# Patient Record
Sex: MALE | Race: ASIAN | NOT HISPANIC OR LATINO | Employment: OTHER | URBAN - METROPOLITAN AREA
[De-identification: names, ages, dates, MRNs, and addresses within clinical notes are randomized per-mention and may not be internally consistent; named-entity substitution may affect disease eponyms.]

---

## 2017-06-05 ENCOUNTER — TELEPHONE (OUTPATIENT)
Dept: HEPATOLOGY | Facility: CLINIC | Age: 67
End: 2017-06-05

## 2017-06-05 DIAGNOSIS — K74.60 CIRRHOSIS OF LIVER WITHOUT ASCITES, UNSPECIFIED HEPATIC CIRRHOSIS TYPE: Primary | ICD-10-CM

## 2017-06-05 NOTE — TELEPHONE ENCOUNTER
MA spoke with pt's son. Follow up appt schedule. Patient address verified, appt letter mailed to patient.

## 2017-06-05 NOTE — TELEPHONE ENCOUNTER
----- Message from Lacey Tran sent at 6/1/2017 12:46 PM CDT -----  Contact: patient  Patient son calling to schedule his appt with shama. Please call  [phone]

## 2017-06-16 ENCOUNTER — TELEPHONE (OUTPATIENT)
Dept: HEPATOLOGY | Facility: CLINIC | Age: 67
End: 2017-06-16

## 2017-06-16 NOTE — TELEPHONE ENCOUNTER
MA spoke with pt;s son, appt reschedule for Monday 06/19 @ 9am on the [employer] Pt's son verbalized understanding.

## 2017-06-16 NOTE — TELEPHONE ENCOUNTER
----- Message from Lacey Tran sent at 6/15/2017  4:29 PM CDT -----  Contact: patient son   Calling to see if someone can call him about his dads labs appt that were cancelled. Please call

## 2017-06-29 ENCOUNTER — TELEPHONE (OUTPATIENT)
Dept: HEPATOLOGY | Facility: CLINIC | Age: 67
End: 2017-06-29

## 2017-06-29 ENCOUNTER — LAB VISIT (OUTPATIENT)
Dept: LAB | Facility: HOSPITAL | Age: 67
End: 2017-06-29
Payer: MEDICARE

## 2017-06-29 ENCOUNTER — OFFICE VISIT (OUTPATIENT)
Dept: HEPATOLOGY | Facility: CLINIC | Age: 67
End: 2017-06-29
Payer: MEDICARE

## 2017-06-29 VITALS
HEIGHT: 65 IN | WEIGHT: 145.94 LBS | SYSTOLIC BLOOD PRESSURE: 123 MMHG | BODY MASS INDEX: 24.32 KG/M2 | RESPIRATION RATE: 18 BRPM | DIASTOLIC BLOOD PRESSURE: 79 MMHG | HEART RATE: 72 BPM | OXYGEN SATURATION: 97 % | TEMPERATURE: 98 F

## 2017-06-29 DIAGNOSIS — I85.10 SECONDARY ESOPHAGEAL VARICES WITHOUT BLEEDING: ICD-10-CM

## 2017-06-29 DIAGNOSIS — B18.1 CHRONIC HEPATITIS B WITH CIRRHOSIS: ICD-10-CM

## 2017-06-29 DIAGNOSIS — K74.60 CIRRHOSIS OF LIVER WITHOUT ASCITES, UNSPECIFIED HEPATIC CIRRHOSIS TYPE: Primary | ICD-10-CM

## 2017-06-29 DIAGNOSIS — B18.1 CHRONIC VIRAL HEPATITIS B WITHOUT DELTA-AGENT: ICD-10-CM

## 2017-06-29 DIAGNOSIS — K74.60 CHRONIC HEPATITIS B WITH CIRRHOSIS: ICD-10-CM

## 2017-06-29 LAB
AFP SERPL-MCNC: 2.5 NG/ML
ALBUMIN SERPL BCP-MCNC: 3.7 G/DL
ALP SERPL-CCNC: 151 U/L
ALT SERPL W/O P-5'-P-CCNC: 57 U/L
ANION GAP SERPL CALC-SCNC: 9 MMOL/L
AST SERPL-CCNC: 51 U/L
BASOPHILS # BLD AUTO: 0.02 K/UL
BASOPHILS NFR BLD: 0.6 %
BILIRUB SERPL-MCNC: 0.6 MG/DL
BUN SERPL-MCNC: 22 MG/DL
CALCIUM SERPL-MCNC: 9.4 MG/DL
CHLORIDE SERPL-SCNC: 109 MMOL/L
CO2 SERPL-SCNC: 25 MMOL/L
CREAT SERPL-MCNC: 1.1 MG/DL
DIFFERENTIAL METHOD: ABNORMAL
EOSINOPHIL # BLD AUTO: 0.1 K/UL
EOSINOPHIL NFR BLD: 4.5 %
ERYTHROCYTE [DISTWIDTH] IN BLOOD BY AUTOMATED COUNT: 13.7 %
EST. GFR  (AFRICAN AMERICAN): >60 ML/MIN/1.73 M^2
EST. GFR  (NON AFRICAN AMERICAN): >60 ML/MIN/1.73 M^2
GLUCOSE SERPL-MCNC: 159 MG/DL
HBV SURFACE AB SER-ACNC: NEGATIVE M[IU]/ML
HBV SURFACE AG SERPL QL IA: POSITIVE
HCT VFR BLD AUTO: 44.9 %
HGB BLD-MCNC: 15.4 G/DL
INR PPP: 1
LYMPHOCYTES # BLD AUTO: 1.1 K/UL
LYMPHOCYTES NFR BLD: 34.6 %
MCH RBC QN AUTO: 32.2 PG
MCHC RBC AUTO-ENTMCNC: 34.3 %
MCV RBC AUTO: 94 FL
MONOCYTES # BLD AUTO: 0.3 K/UL
MONOCYTES NFR BLD: 9.1 %
NEUTROPHILS # BLD AUTO: 1.6 K/UL
NEUTROPHILS NFR BLD: 51.2 %
PLATELET # BLD AUTO: 74 K/UL
PMV BLD AUTO: 12.8 FL
POTASSIUM SERPL-SCNC: 4 MMOL/L
PROT SERPL-MCNC: 7.6 G/DL
PROTHROMBIN TIME: 10.5 SEC
RBC # BLD AUTO: 4.78 M/UL
SODIUM SERPL-SCNC: 143 MMOL/L
WBC # BLD AUTO: 3.09 K/UL

## 2017-06-29 PROCEDURE — 80053 COMPREHEN METABOLIC PANEL: CPT

## 2017-06-29 PROCEDURE — 36415 COLL VENOUS BLD VENIPUNCTURE: CPT

## 2017-06-29 PROCEDURE — 1126F AMNT PAIN NOTED NONE PRSNT: CPT | Mod: ,,, | Performed by: NURSE PRACTITIONER

## 2017-06-29 PROCEDURE — 82105 ALPHA-FETOPROTEIN SERUM: CPT

## 2017-06-29 PROCEDURE — 1159F MED LIST DOCD IN RCRD: CPT | Mod: ,,, | Performed by: NURSE PRACTITIONER

## 2017-06-29 PROCEDURE — 86707 HEPATITIS BE ANTIBODY: CPT

## 2017-06-29 PROCEDURE — 87517 HEPATITIS B DNA QUANT: CPT

## 2017-06-29 PROCEDURE — 99999 PR PBB SHADOW E&M-EST. PATIENT-LVL IV: CPT | Mod: PBBFAC,,, | Performed by: NURSE PRACTITIONER

## 2017-06-29 PROCEDURE — 86706 HEP B SURFACE ANTIBODY: CPT

## 2017-06-29 PROCEDURE — 99214 OFFICE O/P EST MOD 30 MIN: CPT | Mod: S$PBB,,, | Performed by: NURSE PRACTITIONER

## 2017-06-29 PROCEDURE — 87340 HEPATITIS B SURFACE AG IA: CPT

## 2017-06-29 PROCEDURE — 85610 PROTHROMBIN TIME: CPT

## 2017-06-29 PROCEDURE — 87350 HEPATITIS BE AG IA: CPT

## 2017-06-29 PROCEDURE — 85025 COMPLETE CBC W/AUTO DIFF WBC: CPT

## 2017-06-29 RX ORDER — PROPRANOLOL HYDROCHLORIDE 20 MG/1
20 TABLET ORAL 2 TIMES DAILY
COMMUNITY
Start: 2017-06-08 | End: 2021-11-30

## 2017-06-29 RX ORDER — TENOFOVIR DISOPROXIL FUMARATE 300 MG/1
300 TABLET, COATED ORAL DAILY
COMMUNITY
Start: 2017-06-08 | End: 2021-11-30

## 2017-06-29 NOTE — PROGRESS NOTES
[facility] Established Patient Visit    Reason for Visit:  Hepatitis B and cirrhosis    PCP: Logan Centeno    HPI:  This is a 67 y.o. male pt of Dr. Aguilar's here for f/u of well-compensated cirrhosis due to chronic hepatitis B. He has E Ab (+), E Ag (-) chronic hepatitis B and has been maintained on entecavir 1 mg daily. He was initially seen and treated with tenofovir by Dr Wisdom at a time when he presented with a significant variceal bleed. In 2008, he was found to have a measurable HBV DNA viral load and was switched to entecavir. His HBV DNA has remained negative. He is overdue for HCC screening and routine labs. He was last seen one year ago and did not have HCC screening since 5/2016.     He speaks very little English. Visit was conducted with  over the phone today. On his med card he has both entecavir and tenofovir listed. Pt reports he is taking both meds. He tells me he [place] [facility] [place]. He reports is taking propranolol also. He has not had repeat EGD still.    He is immune to hep A. Previous labs showed undetectable viral load with minimally elevated transaminases in 30-40's and normal liver functioning. Plts low at 60-70's. U/s in 3/2015 showed nodular liver with a 0.9 x 0.9 cm small echogenic focus seen within the left lobe of the liver and a 0.9 x 0.8-cm echogenic focus within the right lobe of the liver, unchanged from the prior study could represent small better delineated nodules. MRI was done and showed no enhancing liver lesions. U/s 5/2016 showed subcentimeter echogenic focus within the left lobe of the liver too small to characterize. AFP has been normal.    Pt denies any jaundice, dark urine, hematemesis, melena, slowed mentation, abd distention. He continues to drink 2 beers per week. Pt has been advised pt not to drink at all with his cirrhosis.    He lives with a son, son not been tested or vaccinated. This was discussed at last  "visit but pt's son still has not been tested.      ROS:   GENERAL: Denies fever, chills, weight change, fatigue  HEENT: Denies headaches, dizziness, vision/hearing changes  CARDIOVASCULAR: Denies chest pain, palpitations, or edema  RESPIRATORY: Denies dyspnea, cough  GI: Denies abdominal pain, rectal bleeding, nausea, vomiting. No change in bowel pattern or color  : Denies dysuria, hematuria   SKIN: Denies rash, itching   NEURO: Denies confusion, memory loss, or mood changes  PSYCH: Denies depression or anxiety  HEME/LYMPH: Denies easy bruising or bleeding      PMHX:  has a past medical history of Cirrhosis and Hepatitis B.    PSHX:  has a past surgical history that includes Upper gastrointestinal endoscopy; Colonoscopy; and Neck surgery.    The patient's social and family histories were reviewed by me and updated in the appropriate section of the electronic medical record.    No Known Allergies    Medications reviewed in Epic      Objective Findings:    PHYSICAL EXAM:   Friendly  male, in no acute distress; alert and oriented to person, place and time  VITALS:   /79 (BP Location: Left arm, Patient Position: Sitting)   Pulse 72   Temp 97.7 °F (36.5 °C) (Oral)   Resp 18   Ht 5' 5" (1.651 m)   Wt 66.2 kg (145 lb 15.1 oz)   SpO2 97%   BMI 24.29 kg/m²    HENT: Normocephalic, without obvious abnormality. Oral mucosa pink and moist. Dentition fair.  EYES: Sclerae anicteric.    NECK: Supple.   CARDIOVASCULAR: Regular rate and rhythm. No murmurs.  RESPIRATORY: Normal respiratory effort. BBS CTA. No wheezes or crackles.  GI: Soft, non-tender, non-distended. No hepatosplenomegaly. No masses palpable. No ascites.  EXTREMITIES:  No clubbing, cyanosis or edema.  SKIN: Warm and dry. No jaundice. No rashes noted to exposed skin. No telangectasias noted. No palmar erythema.  NEURO:  Normal gate. No asterixis.  PSYCH:  Memory intact. Thought and speech pattern appropriate. Behavior normal. No depression or " anxiety noted.      Labs:  Lab Results   Component Value Date    WBC 3.79 (L) 05/02/2016    HGB 15.7 05/02/2016    HCT 46.7 05/02/2016    PLT 71 (L) 05/02/2016     05/02/2016    K 4.7 05/02/2016    CREATININE 1.4 05/02/2016    ALT 35 05/02/2016    AST 46 (H) 05/02/2016    ALKPHOS 110 05/02/2016    BILITOT 0.6 05/02/2016    ALBUMIN 3.8 05/02/2016    INR 1.0 05/02/2016    AFP 2.7 05/02/2016       ASSESSMENT:  67 y.o.  male with:  1.  Chronic hepatitis B, E Ag (-), E Ab (+)  -- transaminases normal  -- HBV DNA undetectable, labs overdue though  -- synthetic liver function- nl  -- HCC screening: needs updating  -- immunity to HAV- labs indicate immunity  -- unknown if family members have been vaccinated. Pt lives with son  2. Cirrhosis, well-compensated currently  -- MELD= 10 (5/2016)  -- HCC screening- AFP and abd. U/S- needs repeat  -- Immunity to Hep A - labs indicate immunity  -- EGD- ordered again  3. Portal hypertension  -- EGD- h/o bleeding esophageal varices, on propranolol  -- Ascites- none   -- thrombocytopenia and partial portal vein thrombosis   4. Alcohol use  -- pt advised no alcohol with cirrhosis      EDUCATION:   -- Avoid alcohol. Avoid raw seafood.   -- Discussed transmission of HBV, including sexual transmission. Avoid sharing razors/toothbrushes, etc.   -- Natural history of HBV including progression to cirrhosis reviewed.   -- We discussed the increased risk of hepatocellular carcinoma due to active hepatitis B infection. Continued screening every six months with ultrasound and AFP is recommended.   -- Discussed potential outcomes of cirrhosis, including liver cancer, liver failure, liver transplant, death.   -- Limit acetaminophen to 2000mg daily.  -- Advised immunization of all household members.  -- Discussed importance of compliance with medication regimen and risk of viral mutation if treatment is stopped prematurely.      Handouts in [employer] given to pt. Above was discussed with  pt via .    Called and spoke with pt's pharmacist. She reports pt was seen by Dr. Hinds on 6/8/17 and Viread was Rx'ed. He then had visit again on 6/22. Last refill for entecavir was in 3/2017. Call placed to Dr. Hinds's office to discuss pt's plan of care.       PLAN:  1. Labs today  2. Abd u/s  3. EGD ordered. Discussed importance of this test for varices screening via   4. Pt to bring all meds in bottles to next office visit. Awaiting call back from Dr. Hinds's office regarding his medication and liver disease management.  5. Advised pt to avoid all alcohol  6. Son needs to be tested and vaccinated if negative  7. F/u in 6 weeks. Pt needs to be seen with .    Thank you for allowing me to participate in the care of Ut Dieudonne Blanche    Lissette Sidhu NP-[facility]    Total duration of visit = 30 min, with > 50% spent counseling    CC: Dr. Hinds [facility]    Addendum: Spoke with JUAN Shabazz for Dr. Hinds. She reports they have also been following pt for his liver disease for many yrs. They have been Rx'ing Viread for him this whole time. They were unaware that pt was being seen by us also and had entecavir Rx'ed. She discussed with Dr. Hinds and they have f/u appt with an u/s scheduled soon. They will d/w pt to see who he prefers to f/u with and we will keep visits as scheduled and do the same. Pt only needs to see one provider going forward. They did not have any recent labs on pt, last seen by them in 4/2016.

## 2017-06-29 NOTE — PATIENT INSTRUCTIONS
1. Labs today  2. Abdominal ultrasound  3. You need to have these EVERY 6 MONTHS FOR LIVER CANCER SCREENING  4. EGD to screen for varices, call [phone] to schedule this  5. Son needs to be tested for hepatitis B and if negative he needs to be vaccinated  6. Continue entecavir and propranolol  7. Follow up in 1/2018 with labs and ultrasound one week before appt with me. [place] [facility] [place]

## 2017-07-14 LAB
HBV E AB SER QL: ABNORMAL
HBV E AG SPEC QL: NORMAL
HEPATITIS B VIRAL DNA - QUANTITATIVE: <10 IU/ML
HEPATITIS B VIRUS DNA: NOT DETECTED
LOG HBV IU/ML: <1 LOG (10) IU/ML

## 2017-08-04 ENCOUNTER — HOSPITAL ENCOUNTER (OUTPATIENT)
Dept: RADIOLOGY | Facility: HOSPITAL | Age: 67
Discharge: HOME | End: 2017-08-04
Attending: NURSE PRACTITIONER
Payer: MEDICARE

## 2017-08-04 DIAGNOSIS — K74.60 CIRRHOSIS OF LIVER WITHOUT ASCITES, UNSPECIFIED HEPATIC CIRRHOSIS TYPE: ICD-10-CM

## 2017-08-04 PROCEDURE — 76700 US EXAM ABDOM COMPLETE: CPT | Mod: TC

## 2017-08-04 PROCEDURE — 76700 US EXAM ABDOM COMPLETE: CPT | Mod: 26,,, | Performed by: INTERNAL MEDICINE

## 2017-08-10 ENCOUNTER — OFFICE VISIT (OUTPATIENT)
Dept: HEPATOLOGY | Facility: CLINIC | Age: 67
End: 2017-08-10
Payer: MEDICARE

## 2017-08-10 VITALS
BODY MASS INDEX: 24.06 KG/M2 | HEIGHT: 65 IN | DIASTOLIC BLOOD PRESSURE: 79 MMHG | OXYGEN SATURATION: 97 % | HEART RATE: 82 BPM | WEIGHT: 144.38 LBS | SYSTOLIC BLOOD PRESSURE: 120 MMHG | RESPIRATION RATE: 18 BRPM | TEMPERATURE: 98 F

## 2017-08-10 DIAGNOSIS — B18.1 CHRONIC HEPATITIS B: ICD-10-CM

## 2017-08-10 DIAGNOSIS — I85.10 SECONDARY ESOPHAGEAL VARICES WITHOUT BLEEDING: ICD-10-CM

## 2017-08-10 DIAGNOSIS — K74.60 CIRRHOSIS OF LIVER WITHOUT ASCITES, UNSPECIFIED HEPATIC CIRRHOSIS TYPE: ICD-10-CM

## 2017-08-10 DIAGNOSIS — R74.8 ELEVATED LIVER ENZYMES: Primary | ICD-10-CM

## 2017-08-10 PROCEDURE — 99999 PR PBB SHADOW E&M-EST. PATIENT-LVL IV: CPT | Mod: PBBFAC,,, | Performed by: NURSE PRACTITIONER

## 2017-08-10 PROCEDURE — 99214 OFFICE O/P EST MOD 30 MIN: CPT | Mod: S$PBB,,, | Performed by: NURSE PRACTITIONER

## 2017-08-10 PROCEDURE — 99214 OFFICE O/P EST MOD 30 MIN: CPT | Mod: PBBFAC | Performed by: NURSE PRACTITIONER

## 2017-08-10 PROCEDURE — 1159F MED LIST DOCD IN RCRD: CPT | Mod: ,,, | Performed by: NURSE PRACTITIONER

## 2017-08-10 PROCEDURE — 3008F BODY MASS INDEX DOCD: CPT | Mod: ,,, | Performed by: NURSE PRACTITIONER

## 2017-08-10 PROCEDURE — 1126F AMNT PAIN NOTED NONE PRSNT: CPT | Mod: ,,, | Performed by: NURSE PRACTITIONER

## 2017-08-10 NOTE — PATIENT INSTRUCTIONS
1. Continue tenofovir, continue taking blue pills  2. Stop entecavir, stop pink pills  3. EGD as scheduled with Dr. Hinds  4. Follow up with dr. Hinds as scheduled

## 2017-08-10 NOTE — PROGRESS NOTES
[facility] Established Patient Visit    Reason for Visit:  Hepatitis B and cirrhosis    PCP: Logan Centeno    HPI:  This is a 67 y.o. male pt of Dr. Aguilar's here for f/u of well-compensated cirrhosis due to chronic hepatitis B. He has E Ab (+), E Ag (-) chronic hepatitis B. At last visit it was discovered that pt has been seeing both us and Dr. Hinds for management of his hepatitis B and has been on both entecavir and tenofovir for his hep B treatment.     Per our records, he was initially seen and treated with tenofovir by Dr iWsdom at a time when he presented with a significant variceal bleed. In 2008, he was found to have a measurable HBV DNA viral load and was switched to entecavir. His HBV DNA has remained negative. I spoke with PA at Dr. Hinds's office and informed them of dual management.     He speaks very little English. Visit was conducted with  over the phone again today. Dr. Hinds's office saw him recently again and told him they will keep him on Viread. They said to discuss his entecavir with us. Discussed with pt that he does not need to be on both of these medications as they do the same thing. His hep B can be controlled with just one of them. Pt repeatedly states via  that he has felt better since he has been on both of the medications and he would like to continue to both. He doesn't understand why he can't remain on both meds.    On recent labs, his HBV DNA is undetectable but his transaminases were mildly elevated at AST 51, ALT 57. His u/s today shows stable subcentimeter echogenic focus. AFP nl. MRI was done 10/2015 and showed no enhancing liver lesions. He reports he has an EGD scheduled with Dr. Hinds on 8/24/17.    Pt denies any jaundice, dark urine, hematemesis, melena, slowed mentation, abd distention. He continues to drink 2 beers per week. Pt has been advised pt not to drink at all with his cirrhosis.    He lives with a son, son not been  "tested or vaccinated. This was discussed at last 2 visits but pt's son still has not been tested.      ROS:   GENERAL: Denies fever, chills, weight change, fatigue  HEENT: Denies headaches, dizziness, vision/hearing changes  CARDIOVASCULAR: Denies chest pain, palpitations, or edema  RESPIRATORY: Denies dyspnea, cough  GI: Denies abdominal pain, rectal bleeding, nausea, vomiting. No change in bowel pattern or color  : Denies dysuria, hematuria   SKIN: Denies rash, itching   NEURO: Denies confusion, memory loss, or mood changes  PSYCH: Denies depression or anxiety  HEME/LYMPH: Denies easy bruising or bleeding      PMHX:  has a past medical history of Cirrhosis and Hepatitis B.    PSHX:  has a past surgical history that includes Upper gastrointestinal endoscopy; Colonoscopy; and Neck surgery.    The patient's social and family histories were reviewed by me and updated in the appropriate section of the electronic medical record.    No Known Allergies    Medications reviewed in Epic      Objective Findings:    PHYSICAL EXAM:   Friendly  male, in no acute distress; alert and oriented to person, place and time  VITALS:   /79 (BP Location: Left arm, Patient Position: Sitting, BP Method: Small (Automatic))   Pulse 82   Temp 98.1 °F (36.7 °C) (Oral)   Resp 18   Ht 5' 5" (1.651 m)   Wt 65.5 kg (144 lb 6.4 oz)   SpO2 97%   BMI 24.03 kg/m²    HENT: Normocephalic, without obvious abnormality. Oral mucosa pink and moist. Dentition fair.  EYES: Sclerae anicteric.    NECK: Supple.   CARDIOVASCULAR: Regular rate and rhythm. No murmurs.  RESPIRATORY: Normal respiratory effort. BBS CTA. No wheezes or crackles.  GI: Soft, non-tender, non-distended. No hepatosplenomegaly. No masses palpable. No ascites.  EXTREMITIES:  No clubbing, cyanosis or edema.  SKIN: Warm and dry. No jaundice. No rashes noted to exposed skin. No telangectasias noted. No palmar erythema.  NEURO:  Normal gate. No asterixis.  PSYCH:  Memory intact. " Thought and speech pattern appropriate. Behavior normal. No depression or anxiety noted.      Labs:  Lab Results   Component Value Date    WBC 3.09 (L) 06/29/2017    HGB 15.4 06/29/2017    HCT 44.9 06/29/2017    PLT 74 (L) 06/29/2017     06/29/2017    K 4.0 06/29/2017    CREATININE 1.1 06/29/2017    ALT 57 (H) 06/29/2017    AST 51 (H) 06/29/2017    ALKPHOS 151 (H) 06/29/2017    BILITOT 0.6 06/29/2017    ALBUMIN 3.7 06/29/2017    INR 1.0 06/29/2017    AFP 2.5 06/29/2017       ABD U/S 8/4/17  The visualized pancreas is within normal limits.  There is limited visualization of the abdominal aorta.  The gallbladder demonstrates no gallstones.  The common duct is 3 mm, normal caliber.  The visualized portion of the inferior vena cava is unremarkable.      The liver size is normal, 11 cm.  The parenchyma is heterogeneous compatible with known cirrhosis.  There is a small amount of nonocclusive thrombus at the jose splenic confluence, finding which has been seen on prior examinations.  Within the left lobe of the liver, there is a 6 x 7 x 6 mm echogenic focus, stable.  There is no abdominal ascites.    The kidneys are normal in size.  The right kidney measures 10 cm and the left kidney measures 9.5 cm, with no focal abnormality seen.   Impression      Small amount of nonocclusive thrombus within the jose splenic confluence, finding which has been seen on prior examinations.    In this patient with cirrhosis and a heterogeneous nodular liver, there is a stable subcentimeter echogenic focus within the left hepatic lobe.         ASSESSMENT:  67 y.o.  male with:  1.  Chronic hepatitis B, E Ag (-), E Ab (+). Pt has been taking both tenofovir and entecavir for his hep B. We were Rx'ing entecavir and Dr. Hinds's office was Rx'ing tenofovir. HBV DNA remains undetectable but transaminases mildly elevated on last lab  -- synthetic liver function- nl  -- HCC screening: up to date  -- immunity to HAV- labs indicate  immunity  -- unknown if family members have been vaccinated. Pt lives with son  2. Cirrhosis, well-compensated currently  -- MELD= MELD-Na score: 7 at 6/29/2017  9:49 AM  MELD score: 7 at 6/29/2017  9:49 AM  Calculated from:  Serum Creatinine: 1.1 mg/dL at 6/29/2017  9:49 AM  Serum Sodium: 143 mmol/L (Rounded to 137) at 6/29/2017  9:49 AM  Total Bilirubin: 0.6 mg/dL (Rounded to 1) at 6/29/2017  9:49 AM  INR(ratio): 1.0 at 6/29/2017  9:49 AM  Age: 67 years  -- HCC screening- AFP and abd. U/S- up to date, next due 2/2018  -- Immunity to Hep A - labs indicate immunity  -- EGD- scheduled with Dr. Hinds for 8/24/17  3. Portal hypertension  -- EGD- h/o bleeding esophageal varices, on propranolol  -- Ascites- none   -- thrombocytopenia and partial portal vein thrombosis   4. Alcohol use  -- pt advised no alcohol with cirrhosis      EDUCATION/DISCUSSION:   Discussed pt's hep B management with Dr. Aguilar and since he has been on both medications and HBV DNA remains undetectable, and we are unsure if he has taken lamivudine in the past, we will stop entecavir and have him continue tenofovir. Tenofovir carries less chance of any resistance.     PLAN:  1. Stop entecavir. Called pt's pharmacy to alert them to not fill this anymore. Continue tenofovir.  2. EGD as scheduled with Dr. Hinds  3. HCC screening Q 6 months with AFP and abd. U/S, next due 2/2018  4. Advised pt to avoid all alcohol  5. Son needs to be tested and vaccinated if negative  6. Recommend repeating LFT's since transaminases mildly elevated on last labs. Pt declined to have this done today. Prefers to have done with Dr. Hinds.  7. Pt prefers to f/u with Dr. Hinds for his hep B and cirrhosis management going forward since this is closer for him. Will communicate this to Dr. Hinds's office and recommendations for hep B management.    Thank you for allowing me to participate in the care of Ut Dieudonne Blanche    Lissette Sidhu, NP-C    Total duration of visit =  30 min, with > 50% spent counseling    Case discussed with staff MD, Dr. Aguilar. Agrees with plan of care. No further recommendations at this time.     CC: [facility]

## 2019-11-15 PROBLEM — J20.9 ACUTE BRONCHITIS: Status: ACTIVE | Noted: 2019-11-15

## 2020-02-22 ENCOUNTER — PATIENT OUTREACH (OUTPATIENT)
Dept: ADMINISTRATIVE | Facility: HOSPITAL | Age: 70
End: 2020-02-22